# Patient Record
Sex: FEMALE | Race: WHITE | NOT HISPANIC OR LATINO
[De-identification: names, ages, dates, MRNs, and addresses within clinical notes are randomized per-mention and may not be internally consistent; named-entity substitution may affect disease eponyms.]

---

## 2021-06-21 ENCOUNTER — TRANSCRIPTION ENCOUNTER (OUTPATIENT)
Age: 31
End: 2021-06-21

## 2022-02-15 DIAGNOSIS — K92.1 MELENA: ICD-10-CM

## 2022-02-16 ENCOUNTER — NON-APPOINTMENT (OUTPATIENT)
Age: 32
End: 2022-02-16

## 2022-03-01 ENCOUNTER — NON-APPOINTMENT (OUTPATIENT)
Age: 32
End: 2022-03-01

## 2022-03-02 ENCOUNTER — TRANSCRIPTION ENCOUNTER (OUTPATIENT)
Age: 32
End: 2022-03-02

## 2022-03-02 ENCOUNTER — APPOINTMENT (OUTPATIENT)
Dept: GASTROENTEROLOGY | Facility: CLINIC | Age: 32
End: 2022-03-02
Payer: COMMERCIAL

## 2022-03-02 VITALS
HEART RATE: 120 BPM | DIASTOLIC BLOOD PRESSURE: 73 MMHG | HEIGHT: 62 IN | OXYGEN SATURATION: 98 % | TEMPERATURE: 97.3 F | BODY MASS INDEX: 19.14 KG/M2 | SYSTOLIC BLOOD PRESSURE: 118 MMHG | WEIGHT: 104 LBS | RESPIRATION RATE: 16 BRPM

## 2022-03-02 PROCEDURE — 99204 OFFICE O/P NEW MOD 45 MIN: CPT

## 2022-03-03 LAB
ALBUMIN SERPL ELPH-MCNC: 4.8 G/DL
ALP BLD-CCNC: 50 U/L
ALT SERPL-CCNC: 11 U/L
ANION GAP SERPL CALC-SCNC: 11 MMOL/L
AST SERPL-CCNC: 14 U/L
BASOPHILS # BLD AUTO: 0.04 K/UL
BASOPHILS NFR BLD AUTO: 0.6 %
BILIRUB SERPL-MCNC: 1 MG/DL
BUN SERPL-MCNC: 9 MG/DL
CALCIUM SERPL-MCNC: 9.7 MG/DL
CHLORIDE SERPL-SCNC: 105 MMOL/L
CO2 SERPL-SCNC: 24 MMOL/L
CREAT SERPL-MCNC: 0.8 MG/DL
CRP SERPL-MCNC: <3 MG/L
EGFR: 100 ML/MIN/1.73M2
EOSINOPHIL # BLD AUTO: 0.12 K/UL
EOSINOPHIL NFR BLD AUTO: 1.7 %
GLUCOSE SERPL-MCNC: 89 MG/DL
HCT VFR BLD CALC: 41.2 %
HGB BLD-MCNC: 14 G/DL
IMM GRANULOCYTES NFR BLD AUTO: 0.3 %
LYMPHOCYTES # BLD AUTO: 1.65 K/UL
LYMPHOCYTES NFR BLD AUTO: 23.7 %
MAN DIFF?: NORMAL
MCHC RBC-ENTMCNC: 29.6 PG
MCHC RBC-ENTMCNC: 34 GM/DL
MCV RBC AUTO: 87.1 FL
MONOCYTES # BLD AUTO: 0.37 K/UL
MONOCYTES NFR BLD AUTO: 5.3 %
NEUTROPHILS # BLD AUTO: 4.77 K/UL
NEUTROPHILS NFR BLD AUTO: 68.4 %
PLATELET # BLD AUTO: 345 K/UL
POTASSIUM SERPL-SCNC: 4.2 MMOL/L
PROT SERPL-MCNC: 7.3 G/DL
RBC # BLD: 4.73 M/UL
RBC # FLD: 12.5 %
SODIUM SERPL-SCNC: 139 MMOL/L
WBC # FLD AUTO: 6.97 K/UL

## 2022-03-07 LAB
C DIFF TOXIN B QL PCR REFLEX: NORMAL
CALPROTECTIN FECAL: <16 UG/G
GDH ANTIGEN: NOT DETECTED
TOXIN A AND B: NOT DETECTED

## 2022-03-07 NOTE — HISTORY OF PRESENT ILLNESS
[Heartburn] : denies heartburn [Nausea] : denies nausea [Vomiting] : denies vomiting [Diarrhea] : denies diarrhea [Constipation] : denies constipation [Yellow Skin Or Eyes (Jaundice)] : denies jaundice [Abdominal Pain] : denies abdominal pain [Abdominal Swelling] : denies abdominal swelling [de-identified] : blood in stools  [FreeTextEntry1] : 33 y/o F with no PMH comes with complains of having blood in stools. \par \par Currently she reports that she had a single bowel movement mixed with blood about 10 days ago. Not associated with any pain, urgency, or mucous. Reports a lot of blood, felt as if she had her menses. Has been stable since with no recurrence. Denies any fever, chills, nausea, vomiting, nocturnal symptoms. Has otherwise been in stable health. \par Travelled to [place] about 2 weeks ago, denies any outside food, or anyone else getting sick on the trip. \par Planning to go to [employer] later this week for hiking. \par \par At baseline has a single bowel movement in afternoon, semi-solid with some mucous\par \par Patient was last seen in 2016 for right sided colitis \par Colonoscopy revealed normal colonic mucosa without signs of inflammation. Some erythema was seen in the rectum which was thought to represent prep artifact. \par \par Not taking any chronic medications \par Family History - unsure as she is adopted \par Allergies - Penicillin and Doxycycline \par

## 2022-03-07 NOTE — ASSESSMENT
[FreeTextEntry1] : 31 y/o F with previous history of right sided colitis, colonoscopy just showed some localized erythema in the rectum which was attributed as prep artifact, now comes with complains of single episode of Hematochezia. \par Travelling and will dee dee proc upon her return.\par \par #Hematochezia \par Single episode, painless, no associated factors, benign abdominal exam \par DDx - Hemorrhoids, Diverticular bleeding, \par Less likely to be Proctitis as does not complain of urgency or lower abdominal pain\par Will do Colonoscopy to evaluation \par Labs today - CBC/CMP/CRP\par Canasa suppository to be used PRN if she has another episode\par \par RTC after the procedure \par \par Stormy Mccarty MD \par Advanced IBD Fellow

## 2022-03-07 NOTE — PHYSICAL EXAM
[General Appearance - Alert] : alert [General Appearance - In No Acute Distress] : in no acute distress [Sclera] : the sclera and conjunctiva were normal [Neck Appearance] : the appearance of the neck was normal [] : no respiratory distress [Abdomen Soft] : soft [Abdomen Tenderness] : non-tender [No CVA Tenderness] : no ~M costovertebral angle tenderness [Abnormal Walk] : normal gait [No Focal Deficits] : no focal deficits [Oriented To Time, Place, And Person] : oriented to person, place, and time [FreeTextEntry1] : (done with chaperone)

## 2022-03-31 ENCOUNTER — RESULT REVIEW (OUTPATIENT)
Age: 32
End: 2022-03-31

## 2022-03-31 ENCOUNTER — OUTPATIENT (OUTPATIENT)
Dept: OUTPATIENT SERVICES | Facility: HOSPITAL | Age: 32
LOS: 1 days | Discharge: HOME | End: 2022-03-31
Payer: COMMERCIAL

## 2022-03-31 ENCOUNTER — APPOINTMENT (OUTPATIENT)
Dept: GASTROENTEROLOGY | Facility: HOSPITAL | Age: 32
End: 2022-03-31

## 2022-03-31 PROCEDURE — 88305 TISSUE EXAM BY PATHOLOGIST: CPT | Mod: 26

## 2022-03-31 PROCEDURE — 88305 TISSUE EXAM BY PATHOLOGIST: CPT

## 2022-03-31 PROCEDURE — 45380 COLONOSCOPY AND BIOPSY: CPT

## 2022-04-01 LAB — SURGICAL PATHOLOGY STUDY: SIGNIFICANT CHANGE UP

## 2022-04-13 ENCOUNTER — APPOINTMENT (OUTPATIENT)
Dept: GASTROENTEROLOGY | Facility: CLINIC | Age: 32
End: 2022-04-13
Payer: COMMERCIAL

## 2022-04-13 PROCEDURE — 99214 OFFICE O/P EST MOD 30 MIN: CPT | Mod: 95

## 2022-04-14 NOTE — CONSULT LETTER
[Dear  ___] : Dear  [unfilled], [Courtesy Letter:] : I had the pleasure of seeing your patient, [unfilled], in my office today. [Please see my note below.] : Please see my note below. [Sincerely,] : Sincerely, [FreeTextEntry3] : Sorin Oviedo MD\par Associate Professor of Medicine\par Chief of GI\par Director IBD Program\par [facility]\par

## 2022-04-14 NOTE — HISTORY OF PRESENT ILLNESS
[Heartburn] : denies heartburn [Nausea] : denies nausea [Vomiting] : denies vomiting [Diarrhea] : denies diarrhea [Constipation] : denies constipation [Yellow Skin Or Eyes (Jaundice)] : denies jaundice [Abdominal Pain] : denies abdominal pain [Abdominal Swelling] : denies abdominal swelling [FreeTextEntry1] : 31 y/o F with previous history of right sided colitis, colonoscopy just showed some localized erythema in the rectum which was attributed as prep artifact, seen in 3/2022 with complains of single episode of Hematochezia, comes for follow up after colonoscopy.\par \par No recurrence of hematochezia since the last visit \par Travelled to [facility], was recommended to take Canasa PRN, has not needed to take it so far \par Reports otherwise feeling well, now planning to go to [facility]\par Denies fever, chills, nausea, vomiting, melena, hematochezia, diarrhea, nocturnal symptoms\par \par Colon 03/2022 \par Polyps (2 mm) in the distal rectum. (Polypectomy).  \par Mild erythema, hyperemia without exudate from 0-5cm. \par The colon and TI were otherwise unremarkable (Biopsy).\par \par Path \par 1. Terminal ileum, biopsy: Small intestinal mucosa with non-specific chronic inflammation.\par 2. Right colon, biopsy: Colonic mucosa without significant histologic abnormalities.\par 3. Left colon, biopsy: Colonic mucosa without significant histologic abnormalities.\par 4. Distal rectum, biopsy: Colorectal mucosa with active chronic proctitis.\par 5. Rectal polyp, biopsy:  Colorectal mucosa without significant histologic abnormalities.\par \par Labs done on last visit - wnl\par CRP <3\par FCal <16\par \par Last visit\par Currently she reports that she had a single bowel movement mixed with blood about 10 days ago.\par Not associated with any pain, urgency, or mucous. Reports a lot of blood, felt as if she had her menses. Has been stable since with no recurrence. Denies any fever, chills, nausea, vomiting, nocturnal symptoms. Has otherwise been in stable health. \par Travelled to [facility] about 2 weeks ago, denies any outside food, or anyone else getting sick on the trip. \par Planning to go to [facility] later this week for hiking. \par \par At baseline has a single bowel movement in afternoon, semi-solid with some mucous\par \par Patient was last seen in 2016 for right sided colitis \par Colonoscopy revealed normal colonic mucosa without signs of inflammation. Some erythema was seen in the rectum which was thought to represent prep artifact. \par Not taking any chronic medications \par Family History - unsure as she is adopted \par Allergies - Penicillin and Doxycycline \par

## 2022-04-14 NOTE — ASSESSMENT
[FreeTextEntry1] : 33 y/o F with previous history of infectious colitis, status post recent colonoscopy done to evaluate a single episode of hematochezia found mild proctitis in the distal 5 cm of the rectum and 2 polyps that were not premalignant on pathology.  The pathology does show chronic active proctitis without any evidence of more proximal colon histologic involvement.  There is a nonspecific finding of TI inflammation although the endoscopic appearance was unremarkable.   At this time this is consistent with a presumptive diagnosis of ulcerative proctitis.\par \par Ulcerative Proctitis, clinically asymptomatic\par -Discussed basics of natural history and autoimmune pathology\par Neg inflammatory markers \par TI looked normal on visualization but pathology showed chronic inflammation - unclear meaning, will plan to do MRE in future to assess. \par Discussed treatment options with the patient -standing dose mesalamine oral and topical therapy versus demand based therapy.\par Patient chooses to start on as needed Canasa only at this time \par \par RTC 6 months for follow up\par \par Stormy Mccarty MD \par Advanced IBD Fellow. \par

## 2023-03-22 ENCOUNTER — APPOINTMENT (OUTPATIENT)
Dept: GASTROENTEROLOGY | Facility: CLINIC | Age: 33
End: 2023-03-22
Payer: COMMERCIAL

## 2023-03-22 VITALS
RESPIRATION RATE: 14 BRPM | HEIGHT: 62 IN | BODY MASS INDEX: 19.32 KG/M2 | DIASTOLIC BLOOD PRESSURE: 70 MMHG | HEART RATE: 73 BPM | WEIGHT: 105 LBS | TEMPERATURE: 97.7 F | SYSTOLIC BLOOD PRESSURE: 110 MMHG | OXYGEN SATURATION: 98 %

## 2023-03-22 DIAGNOSIS — K51.211 ULCERATIVE (CHRONIC) PROCTITIS WITH RECTAL BLEEDING: ICD-10-CM

## 2023-03-22 PROCEDURE — 99213 OFFICE O/P EST LOW 20 MIN: CPT

## 2023-03-22 RX ORDER — MESALAMINE 1000 MG/1
1000 SUPPOSITORY RECTAL
Qty: 30 | Refills: 2 | Status: ACTIVE | COMMUNITY
Start: 2022-03-02 | End: 1900-01-01

## 2023-03-22 NOTE — PHYSICAL EXAM
[Alert] : alert [Healthy Appearing] : healthy appearing [No Acute Distress] : no acute distress [Sclera] : the sclera and conjunctiva were normal [Hearing Threshold Finger Rub Not [place]] : hearing was normal [Normal Appearance] : the appearance of the neck was normal [No Acc Muscle Use] : no accessory muscle use [Auscultation Breath Sounds / Voice Sounds] : lungs were clear to auscultation bilaterally [Heart Rate And Rhythm] : heart rate was normal and rhythm regular [Normal S1, S2] : normal S1 and S2 [Bowel Sounds] : normal bowel sounds [Abdomen Tenderness] : non-tender [No Masses] : no abdominal mass palpated [Abdomen Soft] : soft [No CVA Tenderness] : no CVA  tenderness [Abnormal Walk] : normal gait [] : no rash [No Focal Deficits] : no focal deficits [Oriented To Time, Place, And Person] : oriented to person, place, and time [Normal Insight/Judgment] : insight and judgment were intact

## 2023-03-23 LAB
ALBUMIN SERPL ELPH-MCNC: 4.4 G/DL
ALP BLD-CCNC: 49 U/L
ALT SERPL-CCNC: 9 U/L
ANION GAP SERPL CALC-SCNC: 13 MMOL/L
AST SERPL-CCNC: 17 U/L
BASOPHILS # BLD AUTO: 0.04 K/UL
BASOPHILS NFR BLD AUTO: 0.5 %
BILIRUB SERPL-MCNC: 0.6 MG/DL
BUN SERPL-MCNC: 9 MG/DL
CALCIUM SERPL-MCNC: 9.5 MG/DL
CHLORIDE SERPL-SCNC: 105 MMOL/L
CO2 SERPL-SCNC: 20 MMOL/L
CREAT SERPL-MCNC: 0.74 MG/DL
CRP SERPL-MCNC: <3 MG/L
EGFR: 109 ML/MIN/1.73M2
EOSINOPHIL # BLD AUTO: 0.08 K/UL
EOSINOPHIL NFR BLD AUTO: 1 %
GLUCOSE SERPL-MCNC: 83 MG/DL
HCT VFR BLD CALC: 40.6 %
HGB BLD-MCNC: 13.5 G/DL
IMM GRANULOCYTES NFR BLD AUTO: 0.2 %
LYMPHOCYTES # BLD AUTO: 1.75 K/UL
LYMPHOCYTES NFR BLD AUTO: 21.6 %
MAN DIFF?: NORMAL
MCHC RBC-ENTMCNC: 30.1 PG
MCHC RBC-ENTMCNC: 33.3 GM/DL
MCV RBC AUTO: 90.4 FL
MONOCYTES # BLD AUTO: 0.49 K/UL
MONOCYTES NFR BLD AUTO: 6.1 %
NEUTROPHILS # BLD AUTO: 5.71 K/UL
NEUTROPHILS NFR BLD AUTO: 70.6 %
PLATELET # BLD AUTO: 316 K/UL
POTASSIUM SERPL-SCNC: 4.4 MMOL/L
PROT SERPL-MCNC: 7 G/DL
RBC # BLD: 4.49 M/UL
RBC # FLD: 12.5 %
SODIUM SERPL-SCNC: 138 MMOL/L
WBC # FLD AUTO: 8.09 K/UL

## 2023-03-23 NOTE — ASSESSMENT
[FreeTextEntry1] : 32 y/o female with ulcerative proctitis (and ileal biopsy with "nonspecific" chronic inflammation), not on any current therapy, presenting for f/u. Over past year, pt reports 3 significant flares which required Canasa and, more recently, used budesonide foam prescribed by her dad who is a physician (GI). When not flaring, she tends to be more constipated (takes fiber supplement daily and miralax PRN) when days can pass without BM followed by hard stool and diarrhea. Of note, previous planned to do MRE but was not completed and currently undergoing IVF with embryo transfer earlier this week on 3/19. \par \par # Ulcerative Proctitis, (low suspicion for CD)\par # Chronic constipation\par - main symptoms on regular basis more in line with chronic constipation with overflow diarrhea; rec c/w daily fiber supplement and daily miralax use titrated to daily soft BM\par - given recent embryo transfer and possible near-term pregnancy, would not pursue MRE or endoscopic evaluation at this time to further w/u previous ileal pathology finding\par - rec daily Canasa suppository as she reports previous good response when flaring and the inflammation can also be driving the constipation she is experiencing on regular basis\par - f/u CBC, CMP, CRP, fecal calpro\par \par f/u in 2 months\par \par \par Patrick Matthew DO\par Rylan Sanchez IBD Fellow

## 2023-03-23 NOTE — HISTORY OF PRESENT ILLNESS
[FreeTextEntry1] : 32 y/o female with ulcerative proctitis (and ileal biopsy with "nonspecific" chronic inflammation), not on any current therapy, presenting for f/u. Over past year, pt reports 3 significant flares with loose stool, urgency, frequency, and bleeding, which required Canasa and, more recently, used budesonide foam prescribed by her dad who is a physician (GI). When not flaring, she tends to be more constipated (takes fiber supplement daily and miralax PRN) when days can pass without BM followed by hard stool and diarrhea. Also notes some bloating and nausea which worsen when constipated. Of note, previous planned to do MRE but was not completed and currently undergoing IVF with embryo transfer earlier this week on 3/19. \par \par Prior HPI:\par \par 31 y/o F with previous history of right sided colitis, colonoscopy just showed some localized erythema in the rectum which was attributed as prep artifact, seen in 3/2022 with complains of single episode of Hematochezia, comes for follow up after colonoscopy.\par \par No recurrence of hematochezia since the last visit \par Travelled to [facility], was recommended to take Canasa PRN, has not needed to take it so far \par Reports otherwise feeling well, now planning to go to [facility]\par Denies fever, chills, nausea, vomiting, melena, hematochezia, diarrhea, nocturnal symptoms\par \par Colon 03/2022 \par Polyps (2 mm) in the distal rectum. (Polypectomy). \par Mild erythema, hyperemia without exudate from 0-5cm. \par The colon and TI were otherwise unremarkable (Biopsy).\par \par Path \par 1. Terminal ileum, biopsy: Small intestinal mucosa with non-specific chronic inflammation.\par 2. Right colon, biopsy: Colonic mucosa without significant histologic abnormalities.\par 3. Left colon, biopsy: Colonic mucosa without significant histologic abnormalities.\par 4. Distal rectum, biopsy: Colorectal mucosa with active chronic proctitis.\par 5. Rectal polyp, biopsy: Colorectal mucosa without significant histologic abnormalities.\par \par Labs done on last visit - wnl\par CRP <3\par FCal <16\par \par Last visit\par Currently she reports that she had a single bowel movement mixed with blood about 10 days ago.\par Not associated with any pain, urgency, or mucous. Reports a lot of blood, felt as if she had her menses. Has been stable since with no recurrence. Denies any fever, chills, nausea, vomiting, nocturnal symptoms. Has otherwise been in stable health. \par Travelled to [facility] about 2 weeks ago, denies any outside food, or anyone else getting sick on the trip. \par Planning to go to [facility] later this week for hiking. \par \par At baseline has a single bowel movement in afternoon, semi-solid with some mucous\par \par Patient was last seen in 2016 for right sided colitis \par Colonoscopy revealed normal colonic mucosa without signs of inflammation. Some erythema was seen in the rectum which was thought to represent prep artifact. \par Not taking any chronic medications \par Family History - unsure as she is adopted \par Allergies - Penicillin and Doxycycline

## 2023-08-26 ENCOUNTER — EMERGENCY (EMERGENCY)
Facility: HOSPITAL | Age: 33
LOS: 1 days | Discharge: HOME | End: 2023-08-26
Attending: EMERGENCY MEDICINE | Admitting: EMERGENCY MEDICINE
Payer: COMMERCIAL

## 2023-08-26 VITALS
DIASTOLIC BLOOD PRESSURE: 84 MMHG | RESPIRATION RATE: 18 BRPM | SYSTOLIC BLOOD PRESSURE: 115 MMHG | HEART RATE: 84 BPM | WEIGHT: 104.94 LBS | TEMPERATURE: 99 F | OXYGEN SATURATION: 99 %

## 2023-08-26 LAB
APPEARANCE UR: CLEAR — SIGNIFICANT CHANGE UP
BILIRUB UR-MCNC: NEGATIVE — SIGNIFICANT CHANGE UP
COLOR SPEC: YELLOW — SIGNIFICANT CHANGE UP
DIFF PNL FLD: ABNORMAL
GLUCOSE UR QL: NEGATIVE MG/DL — SIGNIFICANT CHANGE UP
HCG SERPL-ACNC: HIGH MIU/ML
KETONES UR-MCNC: NEGATIVE MG/DL — SIGNIFICANT CHANGE UP
LEUKOCYTE ESTERASE UR-ACNC: NEGATIVE — SIGNIFICANT CHANGE UP
NITRITE UR-MCNC: NEGATIVE — SIGNIFICANT CHANGE UP
PH UR: 6.5 — SIGNIFICANT CHANGE UP (ref 5–8)
PROT UR-MCNC: NEGATIVE MG/DL — SIGNIFICANT CHANGE UP
RBC CASTS # UR COMP ASSIST: 4 /HPF — SIGNIFICANT CHANGE UP (ref 0–4)
SP GR SPEC: 1 — LOW (ref 1–1.03)
UROBILINOGEN FLD QL: 0.2 MG/DL — SIGNIFICANT CHANGE UP (ref 0.2–1)
WBC UR QL: 0 /HPF — SIGNIFICANT CHANGE UP (ref 0–5)

## 2023-08-26 PROCEDURE — 99284 EMERGENCY DEPT VISIT MOD MDM: CPT

## 2023-08-26 PROCEDURE — 76801 OB US < 14 WKS SINGLE FETUS: CPT | Mod: 26

## 2023-08-26 PROCEDURE — 76817 TRANSVAGINAL US OBSTETRIC: CPT | Mod: 26

## 2023-08-26 NOTE — ED PROVIDER NOTE - NS ED ROS FT
Patient is positive for vaginal bleeding.  Negative for headache, nausea, vomiting, abdominal pain, dizziness, fever, dysuria

## 2023-08-26 NOTE — ED ADULT NURSE NOTE - DOES PATIENT HAVE ADVANCE DIRECTIVE
Initiate Treatment: Clindamycin- for pustules that pop up
Detail Level: Zone
Continue Regimen: Aczone - Apply a pea sized amount to full face in the morning after cleansing\\nRetinaMicro 0.06%- Apply a pea sized amount to the full face once daily at bedtime after cleansing
Otc Regimen: Cetaphil oil control Foaming cleanser and moisturizer
No

## 2023-08-26 NOTE — ED PROVIDER NOTE - PHYSICAL EXAMINATION
This patient is awake and alert, neurologically intact.  Vital signs within normal.  Afebrile, no hypoxemia.  No respiratory distress noted.  No abdominal pain.  Positive for vaginal bleeding.

## 2023-08-26 NOTE — ED PROVIDER NOTE - NSFOLLOWUPINSTRUCTIONS_ED_ALL_ED_FT
Thank you for letting us take care of you at the [facility] emergency department.  You presented to the emergency department with vaginal bleeding.  Your beta hCG is elevated compared to your last reading.  Results were printed.  Your ultrasound shows a live intrauterine pregnancy.  Please rest, drink plenty of water, avoid lifting heavy objects, avoid intercourse.  Follow-up with your fertility doctor as soon as possible. Return to the nearest Emergency Department with any further concerns.

## 2023-08-26 NOTE — ED ADULT NURSE NOTE - NSFALLUNIVINTERV_ED_ALL_ED
Bed/Stretcher in lowest position, wheels locked, appropriate side rails in place/Call bell, personal items and telephone in reach/Instruct patient to call for assistance before getting out of bed/chair/stretcher/Non-slip footwear applied when patient is off stretcher/[place] to call system/Physically safe environment - no spills, clutter or unnecessary equipment/Purposeful proactive rounding/Room/bathroom lighting operational, light cord in reach

## 2023-08-26 NOTE — ED PROVIDER NOTE - PATIENT PORTAL LINK FT
You can access the FollowMyHealth Patient Portal offered by [facility] by registering at the following website: [email or web address]. By joining [facility]’s FollowMyHealth portal, you will also be able to view your health information using other applications (apps) compatible with our system.

## 2023-08-26 NOTE — ED PROVIDER NOTE - OBJECTIVE STATEMENT
This patient is a 33-year-old female who presents to the emergency department with vaginal bleeding.  Patient is , para 0.  Patient is known fertility medication.  She is 9 weeks pregnant.  States that she gets a weekly ultrasound.  Last week's ultrasound was within normal limits.  Today, just prior to arrival patient developed significant amount of vaginal bleeding.  In the emergency department she is awake and alert, no acute distress.  Patient is tearful and emotional.  Her partner is at the bedside.

## 2023-08-26 NOTE — ED PROVIDER NOTE - CLINICAL SUMMARY MEDICAL DECISION MAKING FREE TEXT BOX
This patient is a 33-year-old female who presents to the emergency department with vaginal bleeding.  Patient is , para 0.  Patient is known fertility medication.  She is 9 weeks pregnant.  States that she gets a weekly ultrasound.  Last week's ultrasound was within normal limits.  Today, just prior to arrival patient developed significant amount of vaginal bleeding.  In the emergency department she is awake and alert, no acute distress.  Patient is tearful and emotional.  Her partner is at the bedside.    Differential diagnosis for this patient includes but is not limited to:   Threatened , missed , complete , fetal demise.    Differential diagnosis could include potentially life-threatening conditions.    Hospitalization considered for this patient.    The following tests were ordered for this patient.    Beta hCG   urinalysis   OB ultrasound This patient is a 33-year-old female who presents to the emergency department with vaginal bleeding.  Patient is , para 0.  Patient is known fertility medication.  She is 9 weeks pregnant.  States that she gets a weekly ultrasound.  Last week's ultrasound was within normal limits.  Today, just prior to arrival patient developed significant amount of vaginal bleeding.  In the emergency department she is awake and alert, no acute distress.  Patient is tearful and emotional.  Her partner is at the bedside.    Differential diagnosis for this patient includes but is not limited to:   Threatened , missed , complete , fetal demise.    Differential diagnosis could include potentially life-threatening conditions.    Hospitalization considered for this patient.    The following tests were ordered for this patient.    Beta hCG   urinalysis   OB ultrasound  Beta-hCG is elevated compared to patient's last beta with OB/GYN.  Urinalysis does not show a UTI.  OB ultrasound shows a single live birth and a possible hematoma.  Patient to be discharged to follow-up with her GYN.

## 2023-08-30 DIAGNOSIS — O20.0 THREATENED ABORTION: ICD-10-CM

## 2023-08-30 DIAGNOSIS — N93.9 ABNORMAL UTERINE AND VAGINAL BLEEDING, UNSPECIFIED: ICD-10-CM

## 2023-08-30 DIAGNOSIS — Z88.0 ALLERGY STATUS TO PENICILLIN: ICD-10-CM

## 2023-08-30 DIAGNOSIS — Z3A.01 LESS THAN 8 WEEKS GESTATION OF PREGNANCY: ICD-10-CM

## 2023-09-19 ENCOUNTER — TRANSCRIPTION ENCOUNTER (OUTPATIENT)
Age: 33
End: 2023-09-19

## 2023-09-20 ENCOUNTER — TRANSCRIPTION ENCOUNTER (OUTPATIENT)
Age: 33
End: 2023-09-20

## 2023-09-20 ENCOUNTER — OUTPATIENT (OUTPATIENT)
Dept: OUTPATIENT SERVICES | Facility: HOSPITAL | Age: 33
LOS: 1 days | Discharge: HOME | End: 2023-09-20
Payer: COMMERCIAL

## 2023-09-20 VITALS
DIASTOLIC BLOOD PRESSURE: 66 MMHG | SYSTOLIC BLOOD PRESSURE: 107 MMHG | RESPIRATION RATE: 15 BRPM | HEART RATE: 72 BPM | OXYGEN SATURATION: 100 %

## 2023-09-20 VITALS
SYSTOLIC BLOOD PRESSURE: 115 MMHG | TEMPERATURE: 99 F | OXYGEN SATURATION: 100 % | DIASTOLIC BLOOD PRESSURE: 76 MMHG | HEART RATE: 89 BPM | WEIGHT: 102.74 LBS | HEIGHT: 62 IN | RESPIRATION RATE: 16 BRPM

## 2023-09-20 PROCEDURE — 88305 TISSUE EXAM BY PATHOLOGIST: CPT | Mod: 26

## 2023-09-20 RX ORDER — ONDANSETRON 8 MG/1
4 TABLET, FILM COATED ORAL ONCE
Refills: 0 | Status: DISCONTINUED | OUTPATIENT
Start: 2023-09-20 | End: 2023-09-20

## 2023-09-20 RX ORDER — ACETAMINOPHEN 500 MG
700 TABLET ORAL ONCE
Refills: 0 | Status: DISCONTINUED | OUTPATIENT
Start: 2023-09-20 | End: 2023-09-20

## 2023-09-20 RX ORDER — SODIUM CHLORIDE 9 MG/ML
1000 INJECTION, SOLUTION INTRAVENOUS
Refills: 0 | Status: DISCONTINUED | OUTPATIENT
Start: 2023-09-20 | End: 2023-09-20

## 2023-09-20 RX ORDER — ACETAMINOPHEN 500 MG
1000 TABLET ORAL ONCE
Refills: 0 | Status: DISCONTINUED | OUTPATIENT
Start: 2023-09-20 | End: 2023-09-20

## 2023-09-20 RX ORDER — FENTANYL CITRATE 50 UG/ML
25 INJECTION INTRAVENOUS
Refills: 0 | Status: DISCONTINUED | OUTPATIENT
Start: 2023-09-20 | End: 2023-09-20

## 2023-09-20 NOTE — ASU PREOP CHECKLIST - HEIGHT IN CM
Chief Complaints and History of Present Illnesses   Patient presents with     Follow Up     Chief Complaint(s) and History of Present Illness(es)     Follow Up     In left eye.  This started months ago.  Occurring constantly.  Since   onset it is stable.  Associated symptoms include headache.  Negative for   discharge and eye pain.  Treatments tried include no treatments.  Pain was   noted as 0/10.              Comments     Pt states no longer having discharge or eye pain. A little bit of a   headache or pain within the left eye socket. Pt has no issues with   Prosthesis.  Refresh and Antibiotic ointment left eye, pt was prescribed oral   antibiotics last week and that helped with the discharge.     Trisha Abraham, COT COT 8:34 AM April 12, 2021                 Assessment & Plan     Tawnya Salinas is a 72 year old female with the following diagnoses:   1. Other mucopurulent conjunctivitis of left eye    2. Anophthalmos       Improved on oral and topical abx    PLAN:  Continue present management   Return to clinic 6 weeks in clinic              Attending Physician Attestation:  I personally called this patient. Complete documentation of historical and exam elements from today's encounter can be found in the full encounter summary report (not reduplicated in this progress note).  I personally obtained the chief complaint(s) and history of present illness.  I confirmed and edited as necessary the review of systems, past medical/surgical history, family history, and social history.  I formulated and edited as necessary the assessment and plan and discussed the findings and management plan with the patient and family.     -Adonay Wilson MD    
157.48

## 2023-09-20 NOTE — PRE-ANESTHESIA EVALUATION ADULT - NSANTHOSAYNRD_GEN_A_CORE
No. BHUPINDER screening performed.  STOP BANG Legend: 0-2 = LOW Risk; 3-4 = INTERMEDIATE Risk; 5-8 = HIGH Risk

## 2023-09-20 NOTE — ASU DISCHARGE PLAN (ADULT/PEDIATRIC) - CARE PROVIDER_API CALL
Joan Perry  Obstetrics and Gynecology  [address] [place] [address]  Phone: [phone]  Fax: [phone]  Follow Up Time:

## 2023-09-21 NOTE — BRIEF OPERATIVE NOTE - NSICDXBRIEFPROCEDURE_GEN_ALL_CORE_FT
PROCEDURES:  Dilation and curettage, uterus, using suction, for missed first trimester  21-Sep-2023 00:22:21  Joan Daniel

## 2023-09-27 LAB — SURGICAL PATHOLOGY STUDY: SIGNIFICANT CHANGE UP

## 2023-10-03 LAB — PRENATAL GENOME CHROMO MICROARRAY: NEGATIVE — SIGNIFICANT CHANGE UP

## 2023-10-12 LAB — CHROM ANALY OVERALL INTERP SPEC-IMP: SIGNIFICANT CHANGE UP

## 2023-10-23 NOTE — PRE-ANESTHESIA EVALUATION ADULT - NSANTHTOTALSCORECAL_ENT_A_CORE
"Subjective   Patient ID [id number]   Luma Krishnamurthy is a 34 y.o.  at 35w2d with a working estimated date of delivery of 2023, by Ultrasound who presents for a routine prenatal visit. She denies vaginal bleeding, leakage of fluid, decreased fetal movements, or contractions.    Her pregnancy is complicated by:  History HSV.  We will call in Valtrex 500 daily.    Objective   Physical Exam:   Weight: 75.8 kg (167 lb)  Expected Total Weight Gain: 7 kg (15 lb)-11.5 kg (25 lb)   Pregravid BMI: 26.76  BP: 112/68                  Prenatal Labs  Urine Dip:  Lab Results   Component Value Date    KETONESU NEGATIVE 2022     Lab Results   Component Value Date    HGB 11.6 (L) 2023    HCT 35.2 (L) 2023    ABO O 2023    HEPBSAG NONREACTIVE 2023     No results found for: \"PAPPA\", \"AFP\", \"HCG\", \"ESTRIOL\", \"INHBA\"  No results found for: \"GLUF\", \"GLUT1\", \"YZACIEZ0YL\", \"FAMRBHT9CV\"    Imaging  The most recent ultrasound was performed on   with a study GA of   and EFW of  .          Assessment/Plan   Recommended flu vaccine and Tdap vaccine.  GBS next visit.  Importance of daily fetal movement.  Signs and symptoms of labor  Continue prenatal vitamin.  Labs reviewed.  Initiate Valtrex 500 mg prophylaxis for history of HSV  GBS at next visit  Expected mode of delivery vaginal  Follow up in 1 week for a routine prenatal visit.  " 0

## 2024-03-25 ENCOUNTER — APPOINTMENT (OUTPATIENT)
Dept: ANTEPARTUM | Facility: CLINIC | Age: 34
End: 2024-03-25
Payer: COMMERCIAL

## 2024-03-25 ENCOUNTER — ASOB RESULT (OUTPATIENT)
Age: 34
End: 2024-03-25

## 2024-03-25 PROCEDURE — 93976 VASCULAR STUDY: CPT

## 2024-03-25 PROCEDURE — 76813 OB US NUCHAL MEAS 1 GEST: CPT

## 2024-03-25 PROCEDURE — 36415 COLL VENOUS BLD VENIPUNCTURE: CPT

## 2024-04-26 ENCOUNTER — APPOINTMENT (OUTPATIENT)
Dept: ANTEPARTUM | Facility: CLINIC | Age: 34
End: 2024-04-26
Payer: COMMERCIAL

## 2024-04-26 ENCOUNTER — ASOB RESULT (OUTPATIENT)
Age: 34
End: 2024-04-26

## 2024-04-26 PROCEDURE — 76817 TRANSVAGINAL US OBSTETRIC: CPT

## 2024-04-26 PROCEDURE — 76805 OB US >/= 14 WKS SNGL FETUS: CPT

## 2024-04-30 ENCOUNTER — NON-APPOINTMENT (OUTPATIENT)
Age: 34
End: 2024-04-30

## 2024-05-16 ENCOUNTER — APPOINTMENT (OUTPATIENT)
Dept: ANTEPARTUM | Facility: CLINIC | Age: 34
End: 2024-05-16

## 2024-05-28 ENCOUNTER — APPOINTMENT (OUTPATIENT)
Dept: ANTEPARTUM | Facility: CLINIC | Age: 34
End: 2024-05-28

## 2024-05-28 ENCOUNTER — ASOB RESULT (OUTPATIENT)
Age: 34
End: 2024-05-28

## 2024-05-28 PROCEDURE — 76817 TRANSVAGINAL US OBSTETRIC: CPT | Mod: 59

## 2024-05-28 PROCEDURE — 76811 OB US DETAILED SNGL FETUS: CPT

## 2024-06-11 ENCOUNTER — ASOB RESULT (OUTPATIENT)
Age: 34
End: 2024-06-11

## 2024-06-11 ENCOUNTER — APPOINTMENT (OUTPATIENT)
Dept: ANTEPARTUM | Facility: CLINIC | Age: 34
End: 2024-06-11
Payer: COMMERCIAL

## 2024-06-11 PROCEDURE — 76816 OB US FOLLOW-UP PER FETUS: CPT

## 2024-06-17 ENCOUNTER — APPOINTMENT (OUTPATIENT)
Dept: PEDIATRIC CARDIOLOGY | Facility: CLINIC | Age: 34
End: 2024-06-17

## 2024-06-17 PROCEDURE — 76827 ECHO EXAM OF FETAL HEART: CPT

## 2024-06-17 PROCEDURE — 76821 MIDDLE CEREBRAL ARTERY ECHO: CPT

## 2024-06-17 PROCEDURE — 76825 ECHO EXAM OF FETAL HEART: CPT

## 2024-06-17 PROCEDURE — 93325 DOPPLER ECHO COLOR FLOW MAPG: CPT | Mod: 59

## 2024-06-17 PROCEDURE — 76820 UMBILICAL ARTERY ECHO: CPT

## 2024-06-17 PROCEDURE — 99203 OFFICE O/P NEW LOW 30 MIN: CPT | Mod: 25

## 2024-06-26 ENCOUNTER — NON-APPOINTMENT (OUTPATIENT)
Age: 34
End: 2024-06-26

## 2024-07-31 ENCOUNTER — APPOINTMENT (OUTPATIENT)
Dept: ANTEPARTUM | Facility: CLINIC | Age: 34
End: 2024-07-31
Payer: COMMERCIAL

## 2024-07-31 ENCOUNTER — ASOB RESULT (OUTPATIENT)
Age: 34
End: 2024-07-31

## 2024-07-31 PROCEDURE — 76816 OB US FOLLOW-UP PER FETUS: CPT

## 2024-07-31 PROCEDURE — 76820 UMBILICAL ARTERY ECHO: CPT | Mod: 59

## 2024-07-31 PROCEDURE — 76819 FETAL BIOPHYS PROFIL W/O NST: CPT | Mod: 59

## 2024-09-04 ENCOUNTER — APPOINTMENT (OUTPATIENT)
Dept: ANTEPARTUM | Facility: CLINIC | Age: 34
End: 2024-09-04
Payer: COMMERCIAL

## 2024-09-04 ENCOUNTER — ASOB RESULT (OUTPATIENT)
Age: 34
End: 2024-09-04

## 2024-09-04 PROCEDURE — 76820 UMBILICAL ARTERY ECHO: CPT | Mod: 59

## 2024-09-04 PROCEDURE — 76819 FETAL BIOPHYS PROFIL W/O NST: CPT | Mod: 59

## 2024-09-04 PROCEDURE — 76816 OB US FOLLOW-UP PER FETUS: CPT

## 2024-09-19 DIAGNOSIS — O99.713 DISEASES OF THE SKIN AND SUBCUTANEOUS TISSUE COMPLICATING PREGNANCY, THIRD TRIMESTER: ICD-10-CM

## 2024-09-19 DIAGNOSIS — L29.9 DISEASES OF THE SKIN AND SUBCUTANEOUS TISSUE COMPLICATING PREGNANCY, THIRD TRIMESTER: ICD-10-CM

## 2024-09-25 ENCOUNTER — APPOINTMENT (OUTPATIENT)
Dept: ANTEPARTUM | Facility: CLINIC | Age: 34
End: 2024-09-25

## 2024-10-04 ENCOUNTER — INPATIENT (INPATIENT)
Facility: HOSPITAL | Age: 34
LOS: 2 days | Discharge: HOME | End: 2024-10-07
Attending: OBSTETRICS & GYNECOLOGY | Admitting: OBSTETRICS & GYNECOLOGY
Payer: COMMERCIAL

## 2024-10-04 VITALS
TEMPERATURE: 98 F | DIASTOLIC BLOOD PRESSURE: 75 MMHG | OXYGEN SATURATION: 99 % | RESPIRATION RATE: 18 BRPM | HEART RATE: 71 BPM | HEIGHT: 62 IN | SYSTOLIC BLOOD PRESSURE: 118 MMHG | WEIGHT: 130.07 LBS

## 2024-10-04 RX ORDER — CHLORHEXIDINE GLUCONATE ORAL RINSE 1.2 MG/ML
1 SOLUTION DENTAL DAILY
Refills: 0 | Status: DISCONTINUED | OUTPATIENT
Start: 2024-10-04 | End: 2024-10-05

## 2024-10-04 RX ORDER — SODIUM CITRATE AND CITRIC ACID MONOHYDRATE 334; 500 MG/5ML; MG/5ML
15 SOLUTION ORAL EVERY 6 HOURS
Refills: 0 | Status: DISCONTINUED | OUTPATIENT
Start: 2024-10-04 | End: 2024-10-05

## 2024-10-04 RX ORDER — SODIUM CHLORIDE IRRIG SOLUTION 0.9 %
1000 SOLUTION, IRRIGATION IRRIGATION
Refills: 0 | Status: DISCONTINUED | OUTPATIENT
Start: 2024-10-04 | End: 2024-10-05

## 2024-10-04 RX ORDER — OXYTOCIN/RINGER'S LACTATE 20/500ML
167 PLASTIC BAG, INJECTION (ML) INTRAVENOUS
Qty: 30 | Refills: 0 | Status: DISCONTINUED | OUTPATIENT
Start: 2024-10-04 | End: 2024-10-05

## 2024-10-04 NOTE — OB RN PATIENT PROFILE - BREAST MILK PROVIDES COLOSTRUM THAT IS HIGH IN PROTEIN
Statement Selected Post-care instructions were reviewed with the patient. Patient is not to engage in any heavy lifting, exercise, or swimming for the next 14 days. Should the patient develop any fevers, chills, bleeding, severe pain patient will contact the office immediately.

## 2024-10-05 LAB
ALBUMIN SERPL ELPH-MCNC: 3.1 G/DL — LOW (ref 3.3–5)
ALP SERPL-CCNC: 249 U/L — HIGH (ref 40–120)
ALT FLD-CCNC: 9 U/L — LOW (ref 10–45)
ANION GAP SERPL CALC-SCNC: 11 MMOL/L — SIGNIFICANT CHANGE UP (ref 5–17)
AST SERPL-CCNC: 20 U/L — SIGNIFICANT CHANGE UP (ref 10–40)
BASOPHILS # BLD AUTO: 0.03 K/UL — SIGNIFICANT CHANGE UP (ref 0–0.2)
BASOPHILS # BLD AUTO: 0.06 K/UL — SIGNIFICANT CHANGE UP (ref 0–0.2)
BASOPHILS NFR BLD AUTO: 0.3 % — SIGNIFICANT CHANGE UP (ref 0–2)
BASOPHILS NFR BLD AUTO: 0.7 % — SIGNIFICANT CHANGE UP (ref 0–2)
BILIRUB SERPL-MCNC: 0.4 MG/DL — SIGNIFICANT CHANGE UP (ref 0.2–1.2)
BLD GP AB SCN SERPL QL: NEGATIVE — SIGNIFICANT CHANGE UP
BUN SERPL-MCNC: 9 MG/DL — SIGNIFICANT CHANGE UP (ref 7–23)
CALCIUM SERPL-MCNC: 8.9 MG/DL — SIGNIFICANT CHANGE UP (ref 8.4–10.5)
CHLORIDE SERPL-SCNC: 106 MMOL/L — SIGNIFICANT CHANGE UP (ref 96–108)
CO2 SERPL-SCNC: 21 MMOL/L — LOW (ref 22–31)
CREAT ?TM UR-MCNC: 32 MG/DL — SIGNIFICANT CHANGE UP
CREAT SERPL-MCNC: 0.77 MG/DL — SIGNIFICANT CHANGE UP (ref 0.5–1.3)
EGFR: 104 ML/MIN/1.73M2 — SIGNIFICANT CHANGE UP
EOSINOPHIL # BLD AUTO: 0.02 K/UL — SIGNIFICANT CHANGE UP (ref 0–0.5)
EOSINOPHIL # BLD AUTO: 0.05 K/UL — SIGNIFICANT CHANGE UP (ref 0–0.5)
EOSINOPHIL NFR BLD AUTO: 0.2 % — SIGNIFICANT CHANGE UP (ref 0–6)
EOSINOPHIL NFR BLD AUTO: 0.6 % — SIGNIFICANT CHANGE UP (ref 0–6)
FIBRINOGEN PPP-MCNC: 390 MG/DL — SIGNIFICANT CHANGE UP (ref 200–445)
GLUCOSE SERPL-MCNC: 69 MG/DL — LOW (ref 70–99)
HCT VFR BLD CALC: 31.7 % — LOW (ref 34.5–45)
HCT VFR BLD CALC: 35.2 % — SIGNIFICANT CHANGE UP (ref 34.5–45)
HGB BLD-MCNC: 10.4 G/DL — LOW (ref 11.5–15.5)
HGB BLD-MCNC: 11.6 G/DL — SIGNIFICANT CHANGE UP (ref 11.5–15.5)
IMM GRANULOCYTES NFR BLD AUTO: 0.7 % — SIGNIFICANT CHANGE UP (ref 0–0.9)
IMM GRANULOCYTES NFR BLD AUTO: 0.8 % — SIGNIFICANT CHANGE UP (ref 0–0.9)
LDH SERPL L TO P-CCNC: SIGNIFICANT CHANGE UP (ref 50–242)
LYMPHOCYTES # BLD AUTO: 1.5 K/UL — SIGNIFICANT CHANGE UP (ref 1–3.3)
LYMPHOCYTES # BLD AUTO: 12.7 % — LOW (ref 13–44)
LYMPHOCYTES # BLD AUTO: 2.19 K/UL — SIGNIFICANT CHANGE UP (ref 1–3.3)
LYMPHOCYTES # BLD AUTO: 25.2 % — SIGNIFICANT CHANGE UP (ref 13–44)
MCHC RBC-ENTMCNC: 26.5 PG — LOW (ref 27–34)
MCHC RBC-ENTMCNC: 27.6 PG — SIGNIFICANT CHANGE UP (ref 27–34)
MCHC RBC-ENTMCNC: 32.8 GM/DL — SIGNIFICANT CHANGE UP (ref 32–36)
MCHC RBC-ENTMCNC: 33 GM/DL — SIGNIFICANT CHANGE UP (ref 32–36)
MCV RBC AUTO: 80.9 FL — SIGNIFICANT CHANGE UP (ref 80–100)
MCV RBC AUTO: 83.6 FL — SIGNIFICANT CHANGE UP (ref 80–100)
MONOCYTES # BLD AUTO: 0.75 K/UL — SIGNIFICANT CHANGE UP (ref 0–0.9)
MONOCYTES # BLD AUTO: 0.9 K/UL — SIGNIFICANT CHANGE UP (ref 0–0.9)
MONOCYTES NFR BLD AUTO: 10.4 % — SIGNIFICANT CHANGE UP (ref 2–14)
MONOCYTES NFR BLD AUTO: 6.3 % — SIGNIFICANT CHANGE UP (ref 2–14)
NEUTROPHILS # BLD AUTO: 5.42 K/UL — SIGNIFICANT CHANGE UP (ref 1.8–7.4)
NEUTROPHILS # BLD AUTO: 9.45 K/UL — HIGH (ref 1.8–7.4)
NEUTROPHILS NFR BLD AUTO: 62.4 % — SIGNIFICANT CHANGE UP (ref 43–77)
NEUTROPHILS NFR BLD AUTO: 79.7 % — HIGH (ref 43–77)
NRBC # BLD: 0 /100 WBCS — SIGNIFICANT CHANGE UP (ref 0–0)
NRBC # BLD: 0 /100 WBCS — SIGNIFICANT CHANGE UP (ref 0–0)
PLATELET # BLD AUTO: 245 K/UL — SIGNIFICANT CHANGE UP (ref 150–400)
PLATELET # BLD AUTO: 269 K/UL — SIGNIFICANT CHANGE UP (ref 150–400)
POTASSIUM SERPL-MCNC: 4 MMOL/L — SIGNIFICANT CHANGE UP (ref 3.5–5.3)
POTASSIUM SERPL-SCNC: 4 MMOL/L — SIGNIFICANT CHANGE UP (ref 3.5–5.3)
PROT ?TM UR-MCNC: 10 MG/DL — SIGNIFICANT CHANGE UP (ref 0–12)
PROT SERPL-MCNC: 6.6 G/DL — SIGNIFICANT CHANGE UP (ref 6–8.3)
PROT/CREAT UR-RTO: 0.3 RATIO — HIGH (ref 0–0.2)
RBC # BLD: 3.92 M/UL — SIGNIFICANT CHANGE UP (ref 3.8–5.2)
RBC # BLD: 4.21 M/UL — SIGNIFICANT CHANGE UP (ref 3.8–5.2)
RBC # FLD: 13.8 % — SIGNIFICANT CHANGE UP (ref 10.3–14.5)
RBC # FLD: 14 % — SIGNIFICANT CHANGE UP (ref 10.3–14.5)
RH IG SCN BLD-IMP: POSITIVE — SIGNIFICANT CHANGE UP
RH IG SCN BLD-IMP: POSITIVE — SIGNIFICANT CHANGE UP
SODIUM SERPL-SCNC: 138 MMOL/L — SIGNIFICANT CHANGE UP (ref 135–145)
T PALLIDUM AB TITR SER: NEGATIVE — SIGNIFICANT CHANGE UP
URATE SERPL-MCNC: 6.2 MG/DL — SIGNIFICANT CHANGE UP (ref 2.5–7)
WBC # BLD: 11.84 K/UL — HIGH (ref 3.8–10.5)
WBC # BLD: 8.68 K/UL — SIGNIFICANT CHANGE UP (ref 3.8–10.5)
WBC # FLD AUTO: 11.84 K/UL — HIGH (ref 3.8–10.5)
WBC # FLD AUTO: 8.68 K/UL — SIGNIFICANT CHANGE UP (ref 3.8–10.5)

## 2024-10-05 RX ORDER — KETOROLAC TROMETHAMINE 10 MG/1
30 TABLET, FILM COATED ORAL ONCE
Refills: 0 | Status: DISCONTINUED | OUTPATIENT
Start: 2024-10-05 | End: 2024-10-05

## 2024-10-05 RX ORDER — SOAP/LANOLIN
1 BAR TOPICAL EVERY 4 HOURS
Refills: 0 | Status: DISCONTINUED | OUTPATIENT
Start: 2024-10-05 | End: 2024-10-07

## 2024-10-05 RX ORDER — MAGNESIUM HYDROXIDE 400 MG/5ML
30 SUSPENSION, ORAL (FINAL DOSE FORM) ORAL
Refills: 0 | Status: DISCONTINUED | OUTPATIENT
Start: 2024-10-05 | End: 2024-10-07

## 2024-10-05 RX ORDER — OXYCODONE HYDROCHLORIDE 30 MG/1
5 TABLET, FILM COATED, EXTENDED RELEASE ORAL
Refills: 0 | Status: DISCONTINUED | OUTPATIENT
Start: 2024-10-05 | End: 2024-10-07

## 2024-10-05 RX ORDER — ACETAMINOPHEN 325 MG
975 TABLET ORAL
Refills: 0 | Status: DISCONTINUED | OUTPATIENT
Start: 2024-10-05 | End: 2024-10-07

## 2024-10-05 RX ORDER — OXYTOCIN/RINGER'S LACTATE 20/500ML
PLASTIC BAG, INJECTION (ML) INTRAVENOUS
Qty: 30 | Refills: 0 | Status: DISCONTINUED | OUTPATIENT
Start: 2024-10-05 | End: 2024-10-05

## 2024-10-05 RX ORDER — PRAMOXINE HYDROCHLORIDE 10 MG/ML
1 LOTION TOPICAL EVERY 4 HOURS
Refills: 0 | Status: DISCONTINUED | OUTPATIENT
Start: 2024-10-05 | End: 2024-10-07

## 2024-10-05 RX ORDER — DIBUCAINE 1 %
1 OINTMENT (GRAM) TOPICAL EVERY 6 HOURS
Refills: 0 | Status: DISCONTINUED | OUTPATIENT
Start: 2024-10-05 | End: 2024-10-07

## 2024-10-05 RX ORDER — HEPARIN SOD,PORK IN 0.45% NACL 5K/1000 ML
250 INTRAVENOUS SOLUTION INTRAVENOUS
Refills: 0 | Status: DISCONTINUED | OUTPATIENT
Start: 2024-10-05 | End: 2024-10-05

## 2024-10-05 RX ORDER — SODIUM CHLORIDE 0.9 % (FLUSH) 0.9 %
3 SYRINGE (ML) INJECTION EVERY 8 HOURS
Refills: 0 | Status: DISCONTINUED | OUTPATIENT
Start: 2024-10-05 | End: 2024-10-07

## 2024-10-05 RX ORDER — TETANUS TOXOID, REDUCED DIPHTHERIA TOXOID AND ACELLULAR PERTUSSIS VACCINE, ADSORBED 5; 2.5; 8; 8; 2.5 [IU]/.5ML; [IU]/.5ML; UG/.5ML; UG/.5ML; UG/.5ML
0.5 SUSPENSION INTRAMUSCULAR ONCE
Refills: 0 | Status: DISCONTINUED | OUTPATIENT
Start: 2024-10-05 | End: 2024-10-07

## 2024-10-05 RX ORDER — ANTI-ITCH CREAM 1 G/100G
1 OINTMENT TOPICAL EVERY 6 HOURS
Refills: 0 | Status: DISCONTINUED | OUTPATIENT
Start: 2024-10-05 | End: 2024-10-07

## 2024-10-05 RX ORDER — OXYTOCIN/RINGER'S LACTATE 20/500ML
167 PLASTIC BAG, INJECTION (ML) INTRAVENOUS
Qty: 30 | Refills: 0 | Status: DISCONTINUED | OUTPATIENT
Start: 2024-10-05 | End: 2024-10-07

## 2024-10-05 RX ORDER — ONDANSETRON HCL/PF 4 MG/2 ML
8 VIAL (ML) INJECTION ONCE
Refills: 0 | Status: DISCONTINUED | OUTPATIENT
Start: 2024-10-05 | End: 2024-10-05

## 2024-10-05 RX ORDER — DIPHENHYDRAMINE HCL 12.5MG/5ML
25 LIQUID (ML) ORAL EVERY 6 HOURS
Refills: 0 | Status: DISCONTINUED | OUTPATIENT
Start: 2024-10-05 | End: 2024-10-07

## 2024-10-05 RX ORDER — ONDANSETRON HCL/PF 4 MG/2 ML
8 VIAL (ML) INJECTION ONCE
Refills: 0 | Status: COMPLETED | OUTPATIENT
Start: 2024-10-05 | End: 2024-10-05

## 2024-10-05 RX ORDER — FAMOTIDINE 40 MG
20 TABLET ORAL ONCE
Refills: 0 | Status: DISCONTINUED | OUTPATIENT
Start: 2024-10-05 | End: 2024-10-05

## 2024-10-05 RX ORDER — PRENATAL VIT,CAL 76/IRON/FOLIC 29 MG-1 MG
1 TABLET ORAL DAILY
Refills: 0 | Status: DISCONTINUED | OUTPATIENT
Start: 2024-10-05 | End: 2024-10-07

## 2024-10-05 RX ORDER — OXYCODONE HYDROCHLORIDE 30 MG/1
5 TABLET, FILM COATED, EXTENDED RELEASE ORAL ONCE
Refills: 0 | Status: DISCONTINUED | OUTPATIENT
Start: 2024-10-05 | End: 2024-10-07

## 2024-10-05 RX ADMIN — KETOROLAC TROMETHAMINE 30 MILLIGRAM(S): 10 TABLET, FILM COATED ORAL at 23:50

## 2024-10-05 RX ADMIN — Medication 167 MILLIUNIT(S)/MIN: at 23:27

## 2024-10-05 RX ADMIN — Medication 8 MILLIGRAM(S): at 11:29

## 2024-10-05 RX ADMIN — Medication 2 MILLIUNIT(S)/MIN: at 01:37

## 2024-10-05 NOTE — OB PROVIDER H&P - CURRENT PREGNANCY COMPLICATIONS, OB PROFILE
None
Patient last chemotherapy on Sept 2017. Patients wife reports plans for chemo in Jan 2018  - On Opdivo and Avastin during last regimen  - Hold off on chemo at this time  - Dr Ya to follow  - Consider xray series of spine to evaluate for possible bone mets

## 2024-10-05 NOTE — OB PROVIDER H&P - NSLOWPPHRISK_OBGYN_A_OB
No previous uterine incision/Busby Pregnancy/Less than or equal to 4 previous vaginal births/No known bleeding disorder/No history of postpartum hemorrhage

## 2024-10-05 NOTE — OB NEONATOLOGY/PEDIATRICIAN DELIVERY SUMMARY - NSPEDSNEONOTESA_OBGYN_ALL_OB_FT
NICU called to delivery of  for meconium. Infant emerged with good tone and cry. Delayed cord clamping x 60 seconds. Infant brought to radiant warmer for routine resuscitation including warm/dry/suction/stim. Physical exam findings WNL. Pulse ox in place. Stable for WBN at this time.

## 2024-10-05 NOTE — OB PROVIDER H&P - HISTORY OF PRESENT ILLNESS
Patient is   34y  at 40w1d pw for term IOL. Denies LOF, VB, CTX. Endorses FM.   Ante: Spontaneous pregnancy. NIPT and anatomy scan wnl. Passed GCT. Denies elevated BP in this pregnancy.  EFW 3200   GBS NEG     OBHX:  G1 -  mAB d+c   G2 - curr   GynHX: denies fibroids, ovarian cysts, abnormal pap smear, STI/herpes.   MedHX: colitis managed with pepcid   Surghx: d+c , arm orthopedic surgery   Medications: LDA 81, pepcid, PNV  Allergies: penicillin and doxycycline - rash/hives     Physical Exam:  T(C): --  HR: 71 (10-04-24 @ 23:34) (71 - 71)  BP: 118/75 (10-04-24 @ 23:34) (118/75 - 118/75)  RR: 18 (10-04-24 @ 23:34) (18 - 18)  SpO2: --    General: NAD  Pulm: no increased WOB  Abdomen: soft, gravid, nontender  Extremities: wnl     TAUS: Cephalic  VE: 1/long     EFM: 125 bpm, mod variability, + accels, - decels; reactive and reassuring  [place]: q6m ctx          Patient is a 35 yo  at 40w1d pw y/o GP at wd presenting for     PLAN  - Admit to L&D  - Consent signed for vaginal delivery, induction of labor with cytotec cervidil cook ballon and pitocin. plan for balloon and pitocin   - NPO  - IV fluids and labs ordered  - GBS neg   - Continuous EFM       Zenaida Gardner, PGY 1  Discussed with Dr. Cruz, PGY3, Dr. Daniel aware.     Patient is   34y  at 40w1d pw for term IOL. Denies LOF, VB, CTX. Endorses FM.   Ante: Spontaneous pregnancy. NIPT and anatomy scan wnl. Passed GCT. Denies elevated BP in this pregnancy.  EFW 3200   GBS NEG     OBHX:  G1 -  mAB d+c   G2 - curr   GynHX: denies fibroids, ovarian cysts, abnormal pap smear, STI/herpes.   MedHX: colitis managed with pepcid   Surghx: d+c , arm orthopedic surgery   Medications: LDA 81, pepcid, PNV  Allergies: penicillin and doxycycline - rash/hives     Physical Exam:  T(C): --  HR: 71 (10-04-24 @ 23:34) (71 - 71)  BP: 118/75 (10-04-24 @ 23:34) (118/75 - 118/75)  RR: 18 (10-04-24 @ 23:34) (18 - 18)  SpO2: --    General: NAD  Pulm: no increased WOB  Abdomen: soft, gravid, nontender  Extremities: wnl     TAUS: Cephalic  VE: 1/long     EFM: 125 bpm, mod variability, + accels, - decels; reactive and reassuring  [place]: q6m ctx          Patient is a 33 yo  at 40w1d presenting for IOL at term    PLAN  - Admit to L&D  - Consent signed for vaginal delivery, induction of labor with cytotec cervidil cook ballon and pitocin. plan for balloon and pitocin   - NPO  - IV fluids and labs ordered  - GBS neg   - Continuous EFM       Zenaida Gardner, PGY 1  Discussed with Dr. Cruz, PGY3, Dr. Daniel aware.

## 2024-10-05 NOTE — OB PROVIDER LABOR PROGRESS NOTE - NS_SUBJECTIVE/OBJECTIVE_OBGYN_ALL_OB_FT
120 mod jennifer + accel - decel   ctx 2/10 irregular   cat I reassuring fetal status  balloon and epidural in situ   cw uptitrating pitocin as tolerated 120 mod jennifer + accel - decel   ctx 2/10 irregular   cat I reassuring fetal status  balloon in situ   cw uptitrating pitocin as tolerated

## 2024-10-05 NOTE — OB PROVIDER LABOR PROGRESS NOTE - ASSESSMENT
Continue to monitor  Plan to start pushing after top off from anesthesia  Dr. Daniel in house
Tracing Cat I at this time  Continue to monitor
Fetal status is reactive and reassuring  Continue with pitocin as tolerated  Balloon in situ

## 2024-10-05 NOTE — OB PROVIDER H&P - BLOOD TRANSFUSION, PREVIOUS, PROFILE
Unable to reach patient after two attempts.      Messages left on voicemail for patient to call back if assistance is still needed.    Patient advised, if condition worsens or becomes life threatening to seek immediate medical assistance by calling 911 or going to the nearest Emergency Dept for an evaluation.      Reason for Disposition   Second attempt to contact caller AND no contact made. Phone number verified.    Protocols used: No Contact or Duplicate Contact Call-A-     no

## 2024-10-05 NOTE — OB RN DELIVERY SUMMARY - NSSELHIDDEN_OBGYN_ALL_OB_FT
[NS_DeliveryAttending1_OBGYN_ALL_OB_FT:[email or web address]=],[NS_DeliveryAssist1_OBGYN_ALL_OB_FT:[email or web address]=],[NS_DeliveryRN_OBGYN_ALL_OB_FT:[id number]=]

## 2024-10-05 NOTE — OB PROVIDER LABOR PROGRESS NOTE - NS_SUBJECTIVE/OBJECTIVE_OBGYN_ALL_OB_FT
balloon placed   1/long/-3   120 mod jennifer+accel - decel  1 ctx/rare  Cat I reassuring fetal status    to begin pitocin in 30 mins

## 2024-10-05 NOTE — OB PROVIDER LABOR PROGRESS NOTE - NS_OBIHIFHRDETAILS_OBGYN_ALL_OB_FT
baseline 120bpm, mod variability, + accels, - decels  cat I tracing, reactive and reassuring
baseline 140, moderate variability, +accels, -decels; Cat I tracing
baseline 135, moderate variability, +accels, -decels; Cat I tracing

## 2024-10-05 NOTE — OB PROVIDER LABOR PROGRESS NOTE - NS_SUBJECTIVE/OBJECTIVE_OBGYN_ALL_OB_FT
Day team assuming care.  EFM reviewed. Baseline 125, moderate variability, +accelerations, early decelerations. Category I tracing.   Ctx q2-3min.     Pitocin at 14, will continue to increase as tolerated.   VS reviewed. BPs normotensive.

## 2024-10-05 NOTE — OB RN DELIVERY SUMMARY - NS_SEPSISRSKCALC_OBGYN_ALL_OB_FT
EOS calculated successfully. EOS Risk Factor: 0.5/1000 live births ([facility] national incidence); GA=40w1d; Temp=99.5; ROM=12.05; GBS='Negative'; Antibiotics='No antibiotics or any antibiotics < 2 hrs prior to birth'

## 2024-10-05 NOTE — OB PROVIDER LABOR PROGRESS NOTE - NS_SUBJECTIVE/OBJECTIVE_OBGYN_ALL_OB_FT
Patient seen at bedside with Dr. Daniel for FHR deceleration. Baseline noted to be decreased since AROM at 0920 with meconium. At 13:30, EFM reviewed. Baseline 110, moderate variability, +accelerations, +nonrecurrent late decelerations to a saurabh of 100. Category II tracing. Ctx q3/10min, irregular. Pitocin at 14, paused due to decelerations.     EFM currently category I. Baseline 125bpm, mod jennifer, +accels, no decels. Ctx q3-4min on toco. Will plan to restart pitocin in 20min.     Dr. Daniel in house.

## 2024-10-05 NOTE — OB PROVIDER LABOR PROGRESS NOTE - NS_SUBJECTIVE/OBJECTIVE_OBGYN_ALL_OB_FT
VE 3 around balloon  120 mod jennifer + accel -decel   ctx q2-3 irregular   cat I reassuring fetal status   c/w pitocin as tolerated; at 10 currently  balloon in situ

## 2024-10-05 NOTE — OB PROVIDER DELIVERY SUMMARY - NSSELHIDDEN_OBGYN_ALL_OB_FT
[NS_DeliveryAttending1_OBGYN_ALL_OB_FT:[email or web address]=],[NS_DeliveryAssist1_OBGYN_ALL_OB_FT:[email or web address]=]

## 2024-10-05 NOTE — OB PROVIDER DELIVERY SUMMARY - NSPROVIDERDELIVERYNOTE_OBGYN_ALL_OB_FT
Patient is a 34y.o.  @40w1d who presented for elective IOL on 10-04-24. Labor was induced with mcgee balloon and pitocin. Patient received an epidural for analgesia, became fully dilated, pushed effectively, and had a . She spontaneously delivered a liveborn female infant. The placenta was delivered spontaneously and intact with 3VC after active management of the third stage with pitocin. A 1st degree perineal laceration was repaired with 2-0 chromic suture without complications. Excellent hemostasis. EBL 300cc. Patient tolerated the procedure well. Mother and infant in stable condition. Dr. Daniel present throughout the procedure.

## 2024-10-06 RX ADMIN — Medication 975 MILLIGRAM(S): at 09:15

## 2024-10-06 RX ADMIN — Medication 975 MILLIGRAM(S): at 20:41

## 2024-10-06 RX ADMIN — Medication 600 MILLIGRAM(S): at 18:24

## 2024-10-06 RX ADMIN — Medication 1 SPRAY(S): at 20:42

## 2024-10-06 RX ADMIN — Medication 975 MILLIGRAM(S): at 01:00

## 2024-10-06 RX ADMIN — Medication 1 APPLICATION(S): at 20:42

## 2024-10-06 RX ADMIN — Medication 3 MILLILITER(S): at 00:40

## 2024-10-06 RX ADMIN — Medication 600 MILLIGRAM(S): at 23:56

## 2024-10-06 RX ADMIN — Medication 975 MILLIGRAM(S): at 14:32

## 2024-10-06 RX ADMIN — Medication 600 MILLIGRAM(S): at 12:33

## 2024-10-06 NOTE — LACTATION INITIAL EVALUATION - NS LACT CON REASON FOR REQ
Matilde Weinberg is a 33 yo  parent to a 40w1d infant. Matilde states that she plans to formula feed only. LC reviewed milk suppression guidelines and educated patient on the signs and symptoms of mastitis. All questions answered at this time, and patient verbalized her understanding of education. LC available via RN.

## 2024-10-06 NOTE — PROGRESS NOTE ADULT - ASSESSMENT
A/P 34y s/p , PPD#1, stable, meeting postpartum milestones    #Postpartum care  - Pain: well controlled on tylenol/motrin  - GI: Tolerating regular diet  - : urinating without difficulty/pain  - DVT prophylaxis: ambulating frequently  - Dispo: PPD 2, unless otherwise specified

## 2024-10-07 ENCOUNTER — TRANSCRIPTION ENCOUNTER (OUTPATIENT)
Age: 34
End: 2024-10-07

## 2024-10-07 VITALS
OXYGEN SATURATION: 98 % | TEMPERATURE: 98 F | SYSTOLIC BLOOD PRESSURE: 130 MMHG | RESPIRATION RATE: 18 BRPM | HEART RATE: 77 BPM | DIASTOLIC BLOOD PRESSURE: 72 MMHG

## 2024-10-07 PROCEDURE — 84156 ASSAY OF PROTEIN URINE: CPT

## 2024-10-07 PROCEDURE — 85384 FIBRINOGEN ACTIVITY: CPT

## 2024-10-07 PROCEDURE — 83615 LACTATE (LD) (LDH) ENZYME: CPT

## 2024-10-07 PROCEDURE — 85025 COMPLETE CBC W/AUTO DIFF WBC: CPT

## 2024-10-07 PROCEDURE — 59050 FETAL MONITOR W/REPORT: CPT

## 2024-10-07 PROCEDURE — 82570 ASSAY OF URINE CREATININE: CPT

## 2024-10-07 PROCEDURE — 84550 ASSAY OF BLOOD/URIC ACID: CPT

## 2024-10-07 PROCEDURE — 36415 COLL VENOUS BLD VENIPUNCTURE: CPT

## 2024-10-07 PROCEDURE — 86850 RBC ANTIBODY SCREEN: CPT

## 2024-10-07 PROCEDURE — 86901 BLOOD TYPING SEROLOGIC RH(D): CPT

## 2024-10-07 PROCEDURE — 86900 BLOOD TYPING SEROLOGIC ABO: CPT

## 2024-10-07 PROCEDURE — 86780 TREPONEMA PALLIDUM: CPT

## 2024-10-07 PROCEDURE — 80053 COMPREHEN METABOLIC PANEL: CPT

## 2024-10-07 RX ORDER — ACETAMINOPHEN 325 MG
3 TABLET ORAL
Qty: 0 | Refills: 0 | DISCHARGE
Start: 2024-10-07

## 2024-10-07 RX ORDER — PRENATAL VIT,CAL 76/IRON/FOLIC 29 MG-1 MG
1 TABLET ORAL
Refills: 0 | DISCHARGE

## 2024-10-07 RX ADMIN — Medication 600 MILLIGRAM(S): at 05:33

## 2024-10-07 RX ADMIN — Medication 600 MILLIGRAM(S): at 13:44

## 2024-10-07 RX ADMIN — Medication 30 MILLILITER(S): at 10:31

## 2024-10-07 RX ADMIN — Medication 17 GRAM(S): at 00:34

## 2024-10-07 RX ADMIN — Medication 975 MILLIGRAM(S): at 15:44

## 2024-10-07 RX ADMIN — Medication 975 MILLIGRAM(S): at 09:18

## 2024-10-07 NOTE — DISCHARGE NOTE OB - CARE PROVIDER_API CALL
Joan Perry  Obstetrics and Gynecology  [address] [place] [id number]  Phone: [phone]  Fax: [phone]  Follow Up Time: 2 months

## 2024-10-07 NOTE — PROGRESS NOTE ADULT - SUBJECTIVE AND OBJECTIVE BOX
Patient evaluated at bedside this morning, resting comfortable in bed, no acute events overnight.  She reports pain is well controlled with tylenol and motrin.  She denies headache, dizziness, chest pain, palpitations, shortness of breath, nausea, vomiting, heavy vaginal bleeding or perineal discomfort. Reports decrease in amount of vaginal bleeding and denies clots.  She has been ambulating without assistance, voiding spontaneously.  Tolerating food well, without nausea/vomit.      Physical Exam:  T(C): 36.4 (10-07-24 @ 05:55), Max: 36.6 (10-06-24 @ 21:00)  HR: 73 (10-07-24 @ 05:55) (62 - 73)  BP: 132/80 (10-07-24 @ 05:55) (110/73 - 132/80)  RR: 18 (10-07-24 @ 05:55) (18 - 19)  SpO2: 98% (10-07-24 @ 05:55) (98% - 98%)    GA: NAD, comfortable, conversational  Abd: soft, nontender, nondistended, no rebound or guarding, uterus firm.  Extremities: no swelling or calf tenderness  Perineum: lochia wnl                          11.6   11.84 )-----------( 245      ( 05 Oct 2024 09:29 )             35.2     10-05    138  |  106  |  9   ----------------------------<  69[L]  4.0   |  21[L]  |  0.77    Ca    8.9      05 Oct 2024 09:29    TPro  6.6  /  Alb  3.1[L]  /  TBili  0.4  /  DBili  x   /  AST  20  /  ALT  9[L]  /  AlkPhos  249[H]  10-05    acetaminophen     Tablet .. 975 milliGRAM(s) Oral <User Schedule>  benzocaine 20%/menthol 0.5% Spray 1 Spray(s) Topical every 6 hours PRN  dibucaine 1% Ointment 1 Application(s) Topical every 6 hours PRN  diphenhydrAMINE 25 milliGRAM(s) Oral every 6 hours PRN  diphtheria/tetanus/pertussis (acellular) Vaccine (Adacel) 0.5 milliLiter(s) IntraMuscular once  hydrocortisone 1% Cream 1 Application(s) Topical every 6 hours PRN  ibuprofen  Tablet. 600 milliGRAM(s) Oral every 6 hours  lanolin Ointment 1 Application(s) Topical every 6 hours PRN  magnesium hydroxide Suspension 30 milliLiter(s) Oral two times a day PRN  oxyCODONE    IR 5 milliGRAM(s) Oral every 3 hours PRN  oxyCODONE    IR 5 milliGRAM(s) Oral once PRN  oxytocin Infusion 167 milliUNIT(s)/Min IV Continuous <Continuous>  pramoxine 1%/zinc 5% Cream 1 Application(s) Topical every 4 hours PRN  prenatal multivitamin 1 Tablet(s) Oral daily  simethicone 80 milliGRAM(s) Chew every 4 hours PRN  sodium chloride 0.9% lock flush 3 milliLiter(s) IV Push every 8 hours  witch hazel Pads 1 Application(s) Topical every 4 hours PRN  
Patient evaluated at bedside this morning, resting comfortable in bed, no acute events overnight.  She reports pain is well controlled with tylenol and motrin.  She denies headache, dizziness, chest pain, palpitations, shortness of breath, nausea, vomiting, fever, chills, heavy vaginal bleeding. She has been ambulating without assistance, voiding spontaneously.  Tolerating food well, without nausea/vomit.      Physical Exam:  T(C): 36.4 (10-06-24 @ 06:00), Max: 36.8 (10-05-24 @ 22:16)  HR: 55 (10-06-24 @ 06:00) (55 - 78)  BP: 113/73 (10-06-24 @ 06:00) (113/73 - 134/77)  RR: 16 (10-06-24 @ 06:00) (15 - 18)  SpO2: 98% (10-06-24 @ 06:00) (96% - 100%)    GA: NAD, A&O x 3  Pulm: no increased work of breathing  Abd: soft, nontender, nondistended, no rebound or guarding, uterus firm.  Extremities: no calf tenderness                          11.6   11.84 )-----------( 245      ( 05 Oct 2024 09:29 )             35.2     10-05    138  |  106  |  9   ----------------------------<  69[L]  4.0   |  21[L]  |  0.77    Ca    8.9      05 Oct 2024 09:29    TPro  6.6  /  Alb  3.1[L]  /  TBili  0.4  /  DBili  x   /  AST  20  /  ALT  9[L]  /  AlkPhos  249[H]  10-05    acetaminophen     Tablet .. 975 milliGRAM(s) Oral <User Schedule>  benzocaine 20%/menthol 0.5% Spray 1 Spray(s) Topical every 6 hours PRN  dibucaine 1% Ointment 1 Application(s) Topical every 6 hours PRN  diphenhydrAMINE 25 milliGRAM(s) Oral every 6 hours PRN  diphtheria/tetanus/pertussis (acellular) Vaccine (Adacel) 0.5 milliLiter(s) IntraMuscular once  hydrocortisone 1% Cream 1 Application(s) Topical every 6 hours PRN  ibuprofen  Tablet. 600 milliGRAM(s) Oral every 6 hours  lanolin Ointment 1 Application(s) Topical every 6 hours PRN  magnesium hydroxide Suspension 30 milliLiter(s) Oral two times a day PRN  oxyCODONE    IR 5 milliGRAM(s) Oral every 3 hours PRN  oxyCODONE    IR 5 milliGRAM(s) Oral once PRN  oxytocin Infusion 167 milliUNIT(s)/Min IV Continuous <Continuous>  pramoxine 1%/zinc 5% Cream 1 Application(s) Topical every 4 hours PRN  prenatal multivitamin 1 Tablet(s) Oral daily  simethicone 80 milliGRAM(s) Chew every 4 hours PRN  sodium chloride 0.9% lock flush 3 milliLiter(s) IV Push every 8 hours  witch hazel Pads 1 Application(s) Topical every 4 hours PRN

## 2024-10-07 NOTE — DISCHARGE NOTE OB - PATIENT PORTAL LINK FT
You can access the FollowMyHealth Patient Portal offered by [facility] by registering at the following website: [email or web address]. By joining [facility]’s FollowMyHealth portal, you will also be able to view your health information using other applications (apps) compatible with our system.

## 2024-10-15 DIAGNOSIS — K52.9 NONINFECTIVE GASTROENTERITIS AND COLITIS, UNSPECIFIED: ICD-10-CM

## 2024-10-15 DIAGNOSIS — Z88.0 ALLERGY STATUS TO PENICILLIN: ICD-10-CM

## 2024-10-15 DIAGNOSIS — Z28.09 IMMUNIZATION NOT CARRIED OUT BECAUSE OF OTHER CONTRAINDICATION: ICD-10-CM

## 2024-10-15 DIAGNOSIS — Z28.21 IMMUNIZATION NOT CARRIED OUT BECAUSE OF PATIENT REFUSAL: ICD-10-CM

## 2024-10-15 DIAGNOSIS — Z34.03 ENCOUNTER FOR SUPERVISION OF NORMAL FIRST PREGNANCY, THIRD TRIMESTER: ICD-10-CM

## 2024-10-15 DIAGNOSIS — Z3A.40 40 WEEKS GESTATION OF PREGNANCY: ICD-10-CM

## (undated) DEVICE — PRESSURE INFUSOR BAG 3000ML

## (undated) DEVICE — POSITIONER FOAM EGG CRATE ULNAR 2PCS (PINK)

## (undated) DEVICE — TUBING SET GRAVITY 2 SPIKE

## (undated) DEVICE — WARMING BLANKET UPPER ADULT

## (undated) DEVICE — SLV COMPRESSION KNEE MED

## (undated) DEVICE — SOL INJ NS 0.9% 1000ML

## (undated) DEVICE — DRSG PAD SANITARY OB

## (undated) DEVICE — GLV 6.5 PROTEXIS (WHITE)

## (undated) DEVICE — PACK D&C

## (undated) DEVICE — POSITIONER STRAP ARMBOARD 1.5X32" DISP

## (undated) DEVICE — DRAPE IRRIGATION POUCH 19X23"